# Patient Record
Sex: MALE | Race: AMERICAN INDIAN OR ALASKA NATIVE
[De-identification: names, ages, dates, MRNs, and addresses within clinical notes are randomized per-mention and may not be internally consistent; named-entity substitution may affect disease eponyms.]

---

## 2019-09-15 ENCOUNTER — HOSPITAL ENCOUNTER (EMERGENCY)
Dept: HOSPITAL 5 - ED | Age: 39
Discharge: HOME | End: 2019-09-15
Payer: SELF-PAY

## 2019-09-15 VITALS — DIASTOLIC BLOOD PRESSURE: 73 MMHG | SYSTOLIC BLOOD PRESSURE: 129 MMHG

## 2019-09-15 DIAGNOSIS — G43.909: Primary | ICD-10-CM

## 2019-09-15 DIAGNOSIS — Z87.891: ICD-10-CM

## 2019-09-15 DIAGNOSIS — J45.909: ICD-10-CM

## 2019-09-15 DIAGNOSIS — Z79.899: ICD-10-CM

## 2019-09-15 DIAGNOSIS — Z90.49: ICD-10-CM

## 2019-09-15 LAB
BASOPHILS # (AUTO): 0 K/MM3 (ref 0–0.1)
BASOPHILS NFR BLD AUTO: 0.6 % (ref 0–1.8)
BUN SERPL-MCNC: 11 MG/DL (ref 9–20)
BUN/CREAT SERPL: 7 %
CALCIUM SERPL-MCNC: 9.3 MG/DL (ref 8.4–10.2)
EOSINOPHIL # BLD AUTO: 0.1 K/MM3 (ref 0–0.4)
EOSINOPHIL NFR BLD AUTO: 2.2 % (ref 0–4.3)
HCT VFR BLD CALC: 47.8 % (ref 35.5–45.6)
HEMOLYSIS INDEX: 18
HGB BLD-MCNC: 16 GM/DL (ref 11.8–15.2)
LYMPHOCYTES # BLD AUTO: 2.6 K/MM3 (ref 1.2–5.4)
LYMPHOCYTES NFR BLD AUTO: 44.4 % (ref 13.4–35)
MCHC RBC AUTO-ENTMCNC: 34 % (ref 32–34)
MCV RBC AUTO: 103 FL (ref 84–94)
MONOCYTES # (AUTO): 0.5 K/MM3 (ref 0–0.8)
MONOCYTES % (AUTO): 8.7 % (ref 0–7.3)
PLATELET # BLD: 225 K/MM3 (ref 140–440)
RBC # BLD AUTO: 4.65 M/MM3 (ref 3.65–5.03)

## 2019-09-15 PROCEDURE — 93005 ELECTROCARDIOGRAM TRACING: CPT

## 2019-09-15 PROCEDURE — 84484 ASSAY OF TROPONIN QUANT: CPT

## 2019-09-15 PROCEDURE — 36415 COLL VENOUS BLD VENIPUNCTURE: CPT

## 2019-09-15 PROCEDURE — 85025 COMPLETE CBC W/AUTO DIFF WBC: CPT

## 2019-09-15 PROCEDURE — 80048 BASIC METABOLIC PNL TOTAL CA: CPT

## 2019-09-15 PROCEDURE — 71045 X-RAY EXAM CHEST 1 VIEW: CPT

## 2019-09-15 PROCEDURE — 96361 HYDRATE IV INFUSION ADD-ON: CPT

## 2019-09-15 PROCEDURE — 96374 THER/PROPH/DIAG INJ IV PUSH: CPT

## 2019-09-15 PROCEDURE — 96375 TX/PRO/DX INJ NEW DRUG ADDON: CPT

## 2019-09-15 PROCEDURE — 93010 ELECTROCARDIOGRAM REPORT: CPT

## 2019-09-15 PROCEDURE — 99284 EMERGENCY DEPT VISIT MOD MDM: CPT

## 2019-09-15 NOTE — XRAY REPORT
CHEST 1 VIEW 



INDICATION / CLINICAL INFORMATION:

Chest Pain.



COMPARISON: 

None available.



FINDINGS:



SUPPORT DEVICES: None.

HEART / MEDIASTINUM: No significant abnormality. 

LUNGS / PLEURA: No significant pulmonary or pleural abnormality..  No pneumothorax. 



ADDITIONAL FINDINGS: No significant additional findings.



IMPRESSION:

1. No acute findings.



Signer Name: Ramesh Horne MD 

Signed: 9/15/2019 3:56 AM

 Workstation Name: [id number]

## 2019-09-15 NOTE — EMERGENCY DEPARTMENT REPORT
ED Headache HPI





- General


Chief Complaint: Headache


Stated Complaint: MIGRAINE, VOMITTING AND CHEST PAIN, CHEST PRESSURE


Time Seen by Provider: 09/15/19 04:23


Source: patient


Exam Limitations: no limitations





- History of Present Illness


Initial Comments: 





37 yo M with hx of migraines presents to the ED with complaint of headache.  Pt 

states pain feels like his usual migraine HAs, located behind the eyes with 

associated photophobia, nausea and vomiting.  Pt reports onset 2 days ago.  

States took imitrex without relief.  Pt reports chest pain after vomiting so 

much.  Denies chest pain at this time.


Timing/Duration: other (2 days)


Quality: severe


Recent Head Trauma: no recent headache/trauma, chronic headaches


Modifying Factors: improves with: exposure to light


Associated Symptoms: nausea/vomiting, weakness (generalized).  denies: 

fever/chills


Allergies/Adverse Reactions: 


Allergies





No Known Allergies Allergy (Unverified 09/15/19 03:01)


   








Home Medications: 


Ambulatory Orders





Butalb/Acetamin/Caff -40 [Fioricet] 1 tab PO Q6HR PRN #10 tab 09/15/19 


Ondansetron [Zofran Odt] 4 mg PO Q8HR PRN #20 tab.rapdis 09/15/19 











ED Review of Systems


ROS: 


Stated complaint: MIGRAINE, VOMITTING AND CHEST PAIN, CHEST PRESSURE


Other details as noted in HPI





Comment: All other systems reviewed and negative


Constitutional: denies: chills, fever


Respiratory: denies: shortness of breath


Cardiovascular: chest pain


Gastrointestinal: nausea, vomiting


Neurological: headache.  denies: weakness, numbness





ED Past Medical Hx





- Past Medical History


Previous Medical History?: Yes


Hx Asthma: Yes





- Surgical History


Past Surgical History?: Yes


Hx Appendectomy: Yes





- Social History


Smoking Status: Former Smoker


Substance Use Type: None





- Medications


Home Medications: 


                                Home Medications











 Medication  Instructions  Recorded  Confirmed  Last Taken  Type


 


Butalb/Acetamin/Caff -40 1 tab PO Q6HR PRN #10 tab 09/15/19  Unknown Rx





[Fioricet]     


 


Ondansetron [Zofran Odt] 4 mg PO Q8HR PRN #20 tab.rapdis 09/15/19  Unknown Rx














ED Physical Exam





- General


Limitations: No Limitations


General appearance: alert, in no apparent distress





- Head


Head exam: Present: atraumatic, normocephalic





- Eye


Eye exam: Present: normal appearance, PERRL, EOMI





- ENT


ENT exam: Present: mucous membranes moist





- Neck


Neck exam: Present: normal inspection, full ROM.  Absent: meningismus





- Respiratory


Respiratory exam: Present: normal lung sounds bilaterally.  Absent: respiratory 

distress





- Cardiovascular


Cardiovascular Exam: Present: regular rate, normal rhythm





- GI/Abdominal


GI/Abdominal exam: Present: soft.  Absent: distended, tenderness





- Extremities Exam


Extremities exam: Present: normal inspection





- Neurological Exam


Neurological exam: Present: alert, oriented X3, CN II-XII intact.  Absent: motor

sensory deficit





- Psychiatric


Psychiatric exam: Present: normal affect, normal mood





- Skin


Skin exam: Present: warm, dry, intact, normal color





ED Course


                                   Vital Signs











  09/15/19 09/15/19 09/15/19





  03:04 05:46 06:20


 


Temperature 98.6 F  98.1 F


 


Pulse Rate 76 63 63


 


Respiratory 16 21 16





Rate   


 


Blood Pressure 178/92 138/84 


 


Blood Pressure   129/73





[Right]   


 


O2 Sat by Pulse 99 100 100





Oximetry   














- Reevaluation(s)


Reevaluation #1: 





09/15/19 05:30


Pt asleep on stretcher.  Pt reports he is feeling much better, HA much improved 

compared to the last 2 days.





ED Medical Decision Making





- Lab Data


Result diagrams: 


                                 09/15/19 03:38





                                 09/15/19 03:38





- EKG Data


EKG shows normal: sinus rhythm, axis, intervals, QRS complexes





- EKG Data


Interpretation: no acute changes, other (ST elevation due to early r

epolarization)





- Radiology Data


Radiology results: report reviewed, image reviewed





- Medical Decision Making





37 yo M presents to ED w/ migraine HA.  No neuro deficits on exam.  Pt reports 

associated N/V, chest pain from vomiting.  EKG shows early repolarization 

changes, troponin negative, CXR normal.  Pt given IV fluids w/ toradol, reglan, 

benadryl, decadron.  Pt feeling much better following administraton.  Rx given 

for fioricet and zofran.  Neurology f/u advised.





- Differential Diagnosis


migraine HA, dehydration, ACS


Critical care attestation.: 


If time is entered above; I have spent that time in minutes in the direct care 

of this critically ill patient, excluding procedure time.








ED Disposition


Clinical Impression: 


 Acute headache, Migraine





Disposition: DC-01 TO HOME OR SELFCARE


Is pt being admited?: No


Condition: Stable


Instructions:  Migraine Headache (ED)


Prescriptions: 


Butalb/Acetamin/Caff -40 [Fioricet] 1 tab PO Q6HR PRN #10 tab


 PRN Reason: Headache


Ondansetron [Zofran Odt] 4 mg PO Q8HR PRN #20 tab.rapdis


 PRN Reason: Vomiting


Referrals: 


PRIMARY CARE,MD [Primary Care Provider] - 3-5 Days


ROCIO NYE MD [Referring] - 3-5 Days

## 2020-04-01 ENCOUNTER — HOSPITAL ENCOUNTER (EMERGENCY)
Dept: HOSPITAL 5 - ED | Age: 40
Discharge: HOME | End: 2020-04-01
Payer: SELF-PAY

## 2020-04-01 VITALS — SYSTOLIC BLOOD PRESSURE: 115 MMHG | DIASTOLIC BLOOD PRESSURE: 76 MMHG

## 2020-04-01 DIAGNOSIS — E86.0: ICD-10-CM

## 2020-04-01 DIAGNOSIS — J45.909: ICD-10-CM

## 2020-04-01 DIAGNOSIS — R74.8: ICD-10-CM

## 2020-04-01 DIAGNOSIS — M79.604: Primary | ICD-10-CM

## 2020-04-01 DIAGNOSIS — Z79.899: ICD-10-CM

## 2020-04-01 DIAGNOSIS — Z90.49: ICD-10-CM

## 2020-04-01 LAB
ALBUMIN SERPL-MCNC: 4.8 G/DL (ref 3.9–5)
ALT SERPL-CCNC: 37 UNITS/L (ref 7–56)
BILIRUB UR QL STRIP: (no result)
BLOOD UR QL VISUAL: (no result)
BUN SERPL-MCNC: 17 MG/DL (ref 9–20)
BUN/CREAT SERPL: 10 %
CALCIUM SERPL-MCNC: 9.3 MG/DL (ref 8.4–10.2)
HCT VFR BLD CALC: 50.2 % (ref 35.5–45.6)
HEMOLYSIS INDEX: 13
HGB BLD-MCNC: 17.3 GM/DL (ref 11.8–15.2)
MCHC RBC AUTO-ENTMCNC: 34 % (ref 32–34)
MCV RBC AUTO: 100 FL (ref 84–94)
PH UR STRIP: 6 [PH] (ref 5–7)
PLATELET # BLD: 254 K/MM3 (ref 140–440)
PROT UR STRIP-MCNC: (no result) MG/DL
RBC # BLD AUTO: 5.02 M/MM3 (ref 3.65–5.03)
RBC #/AREA URNS HPF: 1 /HPF (ref 0–6)
UROBILINOGEN UR-MCNC: < 2 MG/DL (ref ?–2)
WBC #/AREA URNS HPF: < 1 /HPF (ref 0–6)

## 2020-04-01 PROCEDURE — 85027 COMPLETE CBC AUTOMATED: CPT

## 2020-04-01 PROCEDURE — 36415 COLL VENOUS BLD VENIPUNCTURE: CPT

## 2020-04-01 PROCEDURE — 83735 ASSAY OF MAGNESIUM: CPT

## 2020-04-01 PROCEDURE — 96361 HYDRATE IV INFUSION ADD-ON: CPT

## 2020-04-01 PROCEDURE — 99283 EMERGENCY DEPT VISIT LOW MDM: CPT

## 2020-04-01 PROCEDURE — 80053 COMPREHEN METABOLIC PANEL: CPT

## 2020-04-01 PROCEDURE — 81001 URINALYSIS AUTO W/SCOPE: CPT

## 2020-04-01 PROCEDURE — 82550 ASSAY OF CK (CPK): CPT

## 2020-04-01 NOTE — EMERGENCY DEPARTMENT REPORT
ED General Adult HPI





- General


Chief complaint: Recheck/Abnormal Lab/Rx


Stated complaint: CK LEVELS ELEVATED,DEHYDRATION


Time Seen by Provider: 04/01/20 01:40


Source: patient


Mode of arrival: Ambulatory


Limitations: No Limitations





- History of Present Illness


Initial comments: 





Patient is a 28-year-old male who presents emergency room with complaints of 

pain down the back of his right leg that began a couple of weeks ago.  He states

that at rest he does not have any pain but when he gets up and starts to walk 

around he feels the pain down the back of the leg.  Patient states that he saw 

his primary care doctor about this last week and states that his CK was 

approximately 1600 and she advised oral hydration.  Patient was concerned that 

his CK was elevated again.  He states that he also had an MRI performed a couple

weeks ago at [facility] which he states showed some mild impingement upon L5 and S1. 

He states that he has an appointment with a neurosurgeon.  He states that he has

been receiving back injections.  He denies any leg swelling, chest pain, 

shortness of breath.  He denies any allergies to medications.  He did not drive 

to the emergency department.


Severity scale (0 -10): 2





- Related Data


                                  Previous Rx's











 Medication  Instructions  Recorded  Last Taken  Type


 


Butalb/Acetamin/Caff -40 1 tab PO Q6HR PRN #10 tab 09/15/19 Unknown Rx





[Fioricet]    


 


Ondansetron [Zofran Odt] 4 mg PO Q8HR PRN #20 tab.rapdis 09/15/19 Unknown Rx


 


Acetaminophen/Codeine [Tylenol 1 tab PO Q6H PRN #10 tab 04/01/20 Unknown Rx





/Codeine # 3 tab]    


 


Cyclobenzaprine [Flexeril] 10 mg PO QHS PRN #12 tablet 04/01/20 Unknown Rx











                                    Allergies











Allergy/AdvReac Type Severity Reaction Status Date / Time


 


No Known Allergies Allergy   Verified 04/01/20 01:47














ED Review of Systems


ROS: 


Stated complaint: CK LEVELS ELEVATED,DEHYDRATION


Other details as noted in HPI





Comment: All other systems reviewed and negative





ED Past Medical Hx





- Past Medical History


Previous Medical History?: Yes


Hx Asthma: Yes





- Surgical History


Past Surgical History?: Yes


Hx Appendectomy: Yes





- Social History


Smoking Status: Never Smoker


Substance Use Type: None





- Medications


Home Medications: 


                                Home Medications











 Medication  Instructions  Recorded  Confirmed  Last Taken  Type


 


Butalb/Acetamin/Caff -40 1 tab PO Q6HR PRN #10 tab 09/15/19  Unknown Rx





[Fioricet]     


 


Ondansetron [Zofran Odt] 4 mg PO Q8HR PRN #20 tab.rapdis 09/15/19  Unknown Rx


 


Acetaminophen/Codeine [Tylenol 1 tab PO Q6H PRN #10 tab 04/01/20  Unknown Rx





/Codeine # 3 tab]     


 


Cyclobenzaprine [Flexeril] 10 mg PO QHS PRN #12 tablet 04/01/20  Unknown Rx














ED Physical Exam





- General


Limitations: No Limitations


General appearance: alert, in no apparent distress





- Head


Head exam: Present: atraumatic, normocephalic





- Eye


Eye exam: Present: normal appearance





- ENT


ENT exam: Present: mucous membranes moist





- Neck


Neck exam: Present: normal inspection, full ROM.  Absent: tenderness





- Respiratory


Respiratory exam: Present: normal lung sounds bilaterally.  Absent: respiratory 

distress, wheezes, rales, rhonchi, stridor, chest wall tenderness, accessory 

muscle use, decreased breath sounds, prolonged expiratory





- Cardiovascular


Cardiovascular Exam: Present: regular rate, normal rhythm, normal heart sounds. 

 Absent: systolic murmur, diastolic murmur, rubs, gallop





- Extremities Exam


Extremities exam: Present: normal inspection, full ROM, normal capillary refill,

 other (FROM of the BLE, no BLE edema, no erythema, no increased warmth, no calf

 ttp, neurovasculalrly intact).  Absent: pedal edema, joint swelling, calf 

tenderness





- Back Exam


Back exam: Present: normal inspection, full ROM.  Absent: paraspinal tenderness,

 vertebral tenderness





- Neurological Exam


Neurological exam: Present: alert, oriented X3, CN II-XII intact, normal gait.  

Absent: motor sensory deficit





- Psychiatric


Psychiatric exam: Present: normal affect, normal mood





- Skin


Skin exam: Present: warm, dry, intact





ED Course


                                   Vital Signs











  04/01/20 04/01/20





  00:08 02:11


 


Temperature 98.1 F 


 


Pulse Rate 120 H 81


 


Respiratory 18 16





Rate  


 


Blood Pressure 115/76 


 


O2 Sat by Pulse 97 100





Oximetry  














ED Medical Decision Making





- Lab Data


Result diagrams: 


                                 04/01/20 00:33





                                 04/01/20 00:33








Labs











  04/01/20 04/01/20 04/01/20





  00:33 00:33 Unknown


 


WBC  8.7  


 


RBC  5.02  


 


Hgb  17.3 H  


 


Hct  50.2 H  


 


MCV  100 H  


 


MCH  34 H  


 


MCHC  34  


 


RDW  14.7  


 


Plt Count  254  


 


Sodium   138 


 


Potassium   4.2 


 


Chloride   96.1 L 


 


Carbon Dioxide   30 


 


Anion Gap   16 


 


BUN   17 


 


Creatinine   1.7 H 


 


Estimated GFR   55 


 


BUN/Creatinine Ratio   10 


 


Glucose   116 H 


 


Calcium   9.3 


 


Magnesium   2.60 H 


 


Total Bilirubin   0.20 


 


AST   27 


 


ALT   37 


 


Alkaline Phosphatase   82 


 


Total Creatine Kinase   128 


 


Total Protein   7.3 


 


Albumin   4.8 


 


Albumin/Globulin Ratio   1.9 


 


Urine Color    Yellow


 


Urine Turbidity    Clear


 


Urine pH    6.0


 


Ur Specific Gravity    1.012


 


Urine Protein    <15 mg/dl


 


Urine Glucose (UA)    Neg


 


Urine Ketones    Neg


 


Urine Blood    Neg


 


Urine Nitrite    Neg


 


Urine Bilirubin    Neg


 


Urine Urobilinogen    < 2.0


 


Ur Leukocyte Esterase    Neg


 


Urine WBC (Auto)    < 1.0


 


Urine RBC (Auto)    1.0











                                   Vital Signs











  04/01/20 04/01/20





  00:08 02:11


 


Temperature 98.1 F 


 


Pulse Rate 120 H 81


 


Respiratory 18 16





Rate  


 


Blood Pressure 115/76 


 


O2 Sat by Pulse 97 100





Oximetry  














- Medical Decision Making





Patient is a 28-year-old male who presents emergency room with complaints of 

pain down the back of his right leg that began a couple of weeks ago.  He states

 that at rest he does not have any pain but when he gets up and starts to walk 

around he feels the pain down the back of the leg.  Patient states that he saw 

his primary care doctor about this last week and states that his CK was approxim

ately 1600 and she advised oral hydration.  Patient was concerned that his CK 

was elevated again.  He states that he also had an MRI performed a couple weeks 

ago at [facility] which he states showed some mild impingement upon L5 and S1.  He 

states that he has an appointment with a neurosurgeon.  He states that he has 

been receiving back injections.  He denies any leg swelling, chest pain, 

shortness of breath.  He denies any allergies to medications.  He did not drive 

to the emergency department.  Initial vitals with tachycardia which improved 

upon repeat to normal.  No abnormality on physical examination as documented in 

chart, no leg swelling, no calf tenderness, neurovascularly intact.  Labs are 

stable.  Electrolytes are stable.  Kidney function is stable from prior.  CK is 

normal.  UA without protein.  Patient given 1 L IV fluids and Flexeril and 

symptoms improved.  Symptoms most likely related to lumbar radiculopathy 

secondary to his impingement which he states they found on MRI..  Patient 

already has an appointment with a neurosurgeon.  He has no numbness, weakness, 

saddle numbness, bowel bladder incontinence, red flag warning signs.  Patient 

given prescription for Tylenol with codeine and Flexeril.  Advised patient 

Please take medication as prescribed as needed.  Do not drive or operate 

machinery while taking pain medication or muscle relaxer.  May use ice pack, 

heating pad, rest, Epson salt bath.  Follow-up with a primary care doctor.  Keep

 your appointment with your neurosurgeon.  Return to the emergency room for any 

new or worsening symptoms.





- Differential Diagnosis


Rhabdomyolysis, electrolyte disturbance, lumbar radiculopathy, sciatica,DDD


Critical care attestation.: 


If time is entered above; I have spent that time in minutes in the direct care 

of this critically ill patient, excluding procedure time.








ED Disposition


Clinical Impression: 


 Right leg pain





Disposition: DC-01 TO HOME OR SELFCARE


Is pt being admited?: No


Does the pt Need Aspirin: No


Condition: Stable


Instructions:  Lumbar Radiculopathy (ED)


Additional Instructions: 


Please take medication as prescribed as needed.  Do not drive or operate 

machinery while taking pain medication or muscle relaxer.  May use ice pack, 

heating pad, rest, Epson salt bath.  Follow-up with a primary care doctor.  Keep

 your appointment with your neurosurgeon.  Return to the emergency room for any 

new or worsening symptoms.


Prescriptions: 


Cyclobenzaprine [Flexeril] 10 mg PO QHS PRN #12 tablet


 PRN Reason: Muscle Spasm


Acetaminophen/Codeine [Tylenol /Codeine # 3 tab] 1 tab PO Q6H PRN #10 tab


 PRN Reason: Pain , Severe (7-10)


Referrals: 


PRIMARY CARE,MD [Primary Care Provider] - 2-3 Days


[facility] [Outside] - 2-3 Days


Time of Disposition: 02:36


Print Language: ENGLISH